# Patient Record
Sex: MALE | Race: WHITE
[De-identification: names, ages, dates, MRNs, and addresses within clinical notes are randomized per-mention and may not be internally consistent; named-entity substitution may affect disease eponyms.]

---

## 2019-04-13 ENCOUNTER — HOSPITAL ENCOUNTER (EMERGENCY)
Dept: HOSPITAL 11 - JP.ED | Age: 49
Discharge: HOME | End: 2019-04-13
Payer: COMMERCIAL

## 2019-04-13 DIAGNOSIS — Z23: ICD-10-CM
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
## 2019-04-13 DIAGNOSIS — S01.01XA: Primary | ICD-10-CM
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
## 2019-04-13 NOTE — EDM.PDOC
ED HPI GENERAL MEDICAL PROBLEM





- General


Chief Complaint: Laceration


Stated Complaint: LACERATION ON HEAD


Time Seen by Provider: 04/13/19 16:49


Source of Information: Reports: Patient, Family, RN Notes Reviewed


History Limitations: Reports: No Limitations





- History of Present Illness


INITIAL COMMENTS - FREE TEXT/NARRATIVE: 





49-year-old gentleman presents to the emergency department today with a 

laceration to his scalp he injured himself in a crawl space when he lifted his 

head.no loss of consciousness no functional complaints





- Related Data


 Allergies











Allergy/AdvReac Type Severity Reaction Status Date / Time


 


No Known Allergies Allergy   Verified 04/13/19 16:49











Home Meds: 


 Home Meds





Cetirizine HCl [Wal-Zyr] 10 mg PO 04/13/19 [History]











Past Medical History





- Past Health History


Medical/Surgical History: Denies Medical/Surgical History





Social & Family History





- Tobacco Use


Smoking Status *Q: Never Smoker





ED ROS GENERAL





- Review of Systems


Review Of Systems: See Below


Constitutional: Reports: No Symptoms


Skin: Reports: Wound





ED EXAM, SKIN/RASH


Exam: See Below


Text/Narrative:: 





Examination of the scalp she does have a 6 cm laceration it is partially 

through the dermis bleeding is controlled no functional complaints


Exam Limited By: No Limitations


General Appearance: Alert, WD/WN, No Apparent Distress





ED SKIN PROCEDURES





- Laceration/Wound Repair


  ** Right Head


Lac/Wound length In cm: 6


Appearance: Subcutaneous, Irregular, Clean


Distal NVT: Neuro & Vascular Intact, No Tendon Injury


Anesthetic Type: Local


Local Anesthesia - Lidocaine (Xylocaine): 1% with EPI


Local Anesthetic Volume: 2cc


Skin Prep: Saline


Saline Irrigation (cc's): 60


Exploration/Debridement/Repair: Wound Explored, In a Bloodless Field, Explored 

to Base


Closed with: Staples


Tetanus Status Addressed: Yes (2010)


Complications: No





Course





- Vital Signs


Last Recorded V/S: 


 Last Vital Signs











Temp  96.3 F   04/13/19 16:54


 


Pulse  87   04/13/19 16:54


 


Resp  16   04/13/19 16:54


 


BP  149/93 H  04/13/19 16:54


 


Pulse Ox  96   04/13/19 16:54














- Orders/Labs/Meds


Orders: 


 Active Orders 24 hr











 Category Date Time Status


 


 Vaccines to be Administered [RC] PER UNIT ROUTINE Care  04/13/19 17:09 Ordered


 


 Diphth,Pertuss(Acell),Tet Vac [Adacel] Med  04/13/19 17:09 Once





 0.5 ml IM .ONCE ONE   











Meds: 


Medications














Discontinued Medications














Generic Name Dose Route Start Last Admin





  Trade Name Dee Dee  PRN Reason Stop Dose Admin


 


Bacitracin  1 dose  04/13/19 16:49  04/13/19 17:02





  Bacitracin Oint 1 Gm  TOP  04/13/19 16:50  1 dose





  ONETIME ONE   Administration





     





     





     





     


 


Lidocaine/Epinephrine  20 ml  04/13/19 16:49  04/13/19 17:01





  Xylocaine 1% With Epinephrine 1:100,000  SUBCUT  04/13/19 16:50  50 ml





  NOW STA   Administration





     





     





     





     














Departure





- Departure


Time of Disposition: 17:09


Disposition: Home, Self-Care 01


Condition: Good


Clinical Impression: 


Scalp laceration


Qualifiers:


 Encounter type: initial encounter Qualified Code(s): S01.01XA - Laceration 

without foreign body of scalp, initial encounter








- Discharge Information


Referrals: 


PCP,None [Primary Care Provider] - 


Forms:  ED Department Discharge


Additional Instructions: 


Follow wound care instruction sheet, follow-up with your primary care return to 

the emergency department for suture removal





- My Orders


Last 24 Hours: 


My Active Orders





04/13/19 17:09


Vaccines to be Administered [RC] PER UNIT ROUTINE 


Diphth,Pertuss(Acell),Tet Vac [Adacel]   0.5 ml IM .ONCE ONE 














- Assessment/Plan


Last 24 Hours: 


My Active Orders





04/13/19 17:09


Vaccines to be Administered [RC] PER UNIT ROUTINE 


Diphth,Pertuss(Acell),Tet Vac [Adacel]   0.5 ml IM .ONCE ONE 











Plan: 





Assessment





Acuity = acute





Site and laterality = scalp laceration  





Etiology  = secondary to trauma





Manifestations = none  





Location of injury =  Home





Lab values = none  





Plan


Suture removal in 10 days, follow wound care instruction sheet follow-up with 

primary care return emergency department for suture removal

















 This note was dictated using dragon voice recognition software please call 

with any questions on syntax or grammar.